# Patient Record
Sex: FEMALE | Race: WHITE | NOT HISPANIC OR LATINO | Employment: OTHER | URBAN - METROPOLITAN AREA
[De-identification: names, ages, dates, MRNs, and addresses within clinical notes are randomized per-mention and may not be internally consistent; named-entity substitution may affect disease eponyms.]

---

## 2017-08-15 ENCOUNTER — NEW PATIENT (OUTPATIENT)
Dept: URBAN - METROPOLITAN AREA CLINIC 11 | Facility: CLINIC | Age: 77
End: 2017-08-15

## 2017-08-15 VITALS — SYSTOLIC BLOOD PRESSURE: 128 MMHG | HEIGHT: 55 IN | DIASTOLIC BLOOD PRESSURE: 88 MMHG

## 2017-08-16 ASSESSMENT — TONOMETRY
OD_IOP_MMHG: 16
OS_IOP_MMHG: 16

## 2017-08-16 ASSESSMENT — VISUAL ACUITY
OD_SC: 20/40+1
OS_SC: 20/40-2

## 2017-09-19 ENCOUNTER — FOLLOW UP (OUTPATIENT)
Dept: URBAN - METROPOLITAN AREA CLINIC 11 | Facility: CLINIC | Age: 77
End: 2017-09-19

## 2017-09-19 ASSESSMENT — VISUAL ACUITY
OD_SC: 20/25-2
OS_SC: 20/50

## 2017-09-19 ASSESSMENT — TONOMETRY: OS_IOP_MMHG: 10

## 2017-11-21 ENCOUNTER — FOLLOW UP (OUTPATIENT)
Dept: URBAN - METROPOLITAN AREA CLINIC 11 | Facility: CLINIC | Age: 77
End: 2017-11-21

## 2017-11-21 ASSESSMENT — TONOMETRY
OD_IOP_MMHG: 14
OS_IOP_MMHG: 15

## 2017-11-21 ASSESSMENT — VISUAL ACUITY
OD_SC: 20/30+2
OS_SC: 20/50+1

## 2018-01-02 ENCOUNTER — FOLLOW UP (OUTPATIENT)
Dept: URBAN - METROPOLITAN AREA CLINIC 11 | Facility: CLINIC | Age: 78
End: 2018-01-02

## 2018-03-12 ENCOUNTER — FOLLOW UP (OUTPATIENT)
Dept: URBAN - METROPOLITAN AREA CLINIC 11 | Facility: CLINIC | Age: 78
End: 2018-03-12

## 2018-03-14 ASSESSMENT — TONOMETRY: OS_IOP_MMHG: 13

## 2018-03-14 ASSESSMENT — VISUAL ACUITY
OS_SC: 20/40+1
OD_SC: 20/30+1

## 2018-04-17 ENCOUNTER — FOLLOW UP (OUTPATIENT)
Dept: URBAN - METROPOLITAN AREA CLINIC 11 | Facility: CLINIC | Age: 78
End: 2018-04-17

## 2018-04-17 VITALS — DIASTOLIC BLOOD PRESSURE: 98 MMHG | SYSTOLIC BLOOD PRESSURE: 142 MMHG | HEIGHT: 55 IN

## 2018-04-17 ASSESSMENT — VISUAL ACUITY
OD_SC: 20/40-1
OS_SC: 20/100-1

## 2018-04-17 ASSESSMENT — TONOMETRY: OS_IOP_MMHG: 14

## 2018-08-02 ENCOUNTER — FOLLOW UP (OUTPATIENT)
Dept: URBAN - METROPOLITAN AREA CLINIC 11 | Facility: CLINIC | Age: 78
End: 2018-08-02

## 2018-08-02 ASSESSMENT — VISUAL ACUITY
OD_SC: 20/50-2
OS_SC: 20/30

## 2018-08-02 ASSESSMENT — TONOMETRY
OD_IOP_MMHG: 12
OS_IOP_MMHG: 13

## 2018-10-30 ENCOUNTER — FOLLOW UP (OUTPATIENT)
Dept: URBAN - METROPOLITAN AREA CLINIC 11 | Facility: CLINIC | Age: 78
End: 2018-10-30

## 2018-10-30 ASSESSMENT — TONOMETRY
OS_IOP_MMHG: 9
OD_IOP_MMHG: 7

## 2018-10-30 ASSESSMENT — VISUAL ACUITY
OS_SC: 20/40
OD_SC: 20/40-1

## 2018-11-29 ENCOUNTER — FOLLOW UP (OUTPATIENT)
Dept: URBAN - METROPOLITAN AREA CLINIC 11 | Facility: CLINIC | Age: 78
End: 2018-11-29

## 2018-11-29 ASSESSMENT — VISUAL ACUITY
OD_PH: 20/40-2
OS_SC: 20/40
OD_SC: 20/50+1

## 2018-11-29 ASSESSMENT — TONOMETRY
OS_IOP_MMHG: 11
OD_IOP_MMHG: 09

## 2019-01-17 ENCOUNTER — FOLLOW UP (OUTPATIENT)
Dept: URBAN - METROPOLITAN AREA CLINIC 11 | Facility: CLINIC | Age: 79
End: 2019-01-17

## 2019-01-17 ASSESSMENT — TONOMETRY
OD_IOP_MMHG: 10
OS_IOP_MMHG: 10

## 2019-01-17 ASSESSMENT — VISUAL ACUITY
OD_SC: 20/400
OS_SC: 20/30-1

## 2019-03-12 ENCOUNTER — FOLLOW UP (OUTPATIENT)
Dept: URBAN - METROPOLITAN AREA CLINIC 11 | Facility: CLINIC | Age: 79
End: 2019-03-12

## 2019-03-12 ASSESSMENT — VISUAL ACUITY
OD_SC: 20/40
OS_SC: 20/50-2

## 2019-03-12 ASSESSMENT — TONOMETRY
OS_IOP_MMHG: 16
OD_IOP_MMHG: 17

## 2019-04-04 ENCOUNTER — FOLLOW UP (OUTPATIENT)
Dept: URBAN - METROPOLITAN AREA CLINIC 11 | Facility: CLINIC | Age: 79
End: 2019-04-04

## 2019-04-04 ASSESSMENT — TONOMETRY
OS_IOP_MMHG: 12
OD_IOP_MMHG: 10

## 2019-04-04 ASSESSMENT — VISUAL ACUITY
OD_SC: 20/30-1
OS_SC: 20/40+2

## 2019-09-03 NOTE — PATIENT DISCUSSION
DRY EYE SYNDROME OU: SHE NOTES THAT ATs HELPS WHEN SHE DOES USE THEM ONCE DAILY. CONTINUE TO USE GOOD QUALITY ARTIFICIAL TEARS 3-4 TIMES DAILY. INSTRUCTED PATIENT NOT TO USE VISINE OR CLEAR EYES. WILL CONTINUE TO FOLLOW.

## 2019-11-12 ENCOUNTER — FOLLOW UP (OUTPATIENT)
Dept: URBAN - METROPOLITAN AREA CLINIC 11 | Facility: CLINIC | Age: 79
End: 2019-11-12

## 2019-11-12 ASSESSMENT — VISUAL ACUITY
OD_SC: 20/40-2
OS_SC: 20/30

## 2019-11-12 ASSESSMENT — TONOMETRY
OS_IOP_MMHG: 10
OD_IOP_MMHG: 10

## 2020-01-09 ENCOUNTER — FOLLOW UP (OUTPATIENT)
Dept: URBAN - METROPOLITAN AREA CLINIC 11 | Facility: CLINIC | Age: 80
End: 2020-01-09

## 2020-01-09 ASSESSMENT — TONOMETRY
OS_IOP_MMHG: 12
OD_IOP_MMHG: 12

## 2020-01-09 ASSESSMENT — VISUAL ACUITY
OS_SC: 20/30
OD_SC: 20/70-1

## 2020-04-27 ENCOUNTER — FOLLOW UP (OUTPATIENT)
Dept: URBAN - METROPOLITAN AREA CLINIC 11 | Facility: CLINIC | Age: 80
End: 2020-04-27

## 2020-04-28 ASSESSMENT — TONOMETRY
OD_IOP_MMHG: 8
OS_IOP_MMHG: 9

## 2020-04-28 ASSESSMENT — VISUAL ACUITY
OS_SC: 20/40-2
OD_SC: 20/50-1

## 2020-05-28 ENCOUNTER — FOLLOW UP (OUTPATIENT)
Dept: URBAN - METROPOLITAN AREA CLINIC 11 | Facility: CLINIC | Age: 80
End: 2020-05-28

## 2020-05-28 ASSESSMENT — VISUAL ACUITY
OD_SC: 20/30-2
OS_SC: 20/30-2

## 2020-05-28 ASSESSMENT — TONOMETRY
OS_IOP_MMHG: 10
OD_IOP_MMHG: 10

## 2020-06-23 NOTE — PATIENT DISCUSSION
ALLERGIC CONJUNCTIVITIS:   The patient is advised to use artificial tears for symptomatic relief. Call office for appointment if no improvement or worsens.  Instructed pt. to get OTC Zaditor bid ou and use artificial tears 3-4 times daily

## 2020-06-23 NOTE — PATIENT DISCUSSION
DRY EYES : Discussed with patient the importance of keeping the eye moist and the symptoms associated with dry eyes including blurry vision, tearing, burning, and luis alberto sensation. Advised patient to minimize use of any fans blowing directly on the face. Advised patient to continue with artificial tears 2-3 times daily.

## 2020-07-02 ENCOUNTER — FOLLOW UP (OUTPATIENT)
Dept: URBAN - METROPOLITAN AREA CLINIC 11 | Facility: CLINIC | Age: 80
End: 2020-07-02

## 2020-07-02 ASSESSMENT — VISUAL ACUITY
OS_SC: 20/30-1
OD_SC: 20/200-1

## 2020-07-02 ASSESSMENT — TONOMETRY
OD_IOP_MMHG: 11
OS_IOP_MMHG: 12

## 2020-07-28 ENCOUNTER — FOLLOW UP (OUTPATIENT)
Dept: URBAN - METROPOLITAN AREA CLINIC 11 | Facility: CLINIC | Age: 80
End: 2020-07-28

## 2020-07-28 ASSESSMENT — TONOMETRY
OD_IOP_MMHG: 12
OS_IOP_MMHG: 10

## 2020-07-28 ASSESSMENT — VISUAL ACUITY
OD_SC: 20/400
OS_SC: 20/50+2

## 2020-09-29 ENCOUNTER — FOLLOW UP (OUTPATIENT)
Dept: URBAN - METROPOLITAN AREA CLINIC 11 | Facility: CLINIC | Age: 80
End: 2020-09-29

## 2020-09-29 RX ORDER — PREDNISOLONE ACETATE 10 MG/ML
1 SUSPENSION/ DROPS OPHTHALMIC
Start: 2020-09-29

## 2020-09-29 ASSESSMENT — VISUAL ACUITY
OS_SC: 20/40+1
OD_SC: 20/400

## 2020-09-29 ASSESSMENT — TONOMETRY
OS_IOP_MMHG: 12
OD_IOP_MMHG: 10

## 2020-11-09 ENCOUNTER — FOLLOW UP (OUTPATIENT)
Dept: URBAN - METROPOLITAN AREA CLINIC 18 | Facility: CLINIC | Age: 80
End: 2020-11-09

## 2020-11-10 ASSESSMENT — TONOMETRY
OS_IOP_MMHG: 10
OD_IOP_MMHG: 09

## 2020-11-10 ASSESSMENT — VISUAL ACUITY
OD_SC: 20/400-
OS_SC: 20/30-1

## 2021-01-05 ENCOUNTER — FOLLOW UP (OUTPATIENT)
Dept: URBAN - METROPOLITAN AREA CLINIC 11 | Facility: CLINIC | Age: 81
End: 2021-01-05

## 2021-01-05 ASSESSMENT — VISUAL ACUITY
OD_SC: 20/400
OS_SC: 20/60-2

## 2021-01-05 ASSESSMENT — TONOMETRY: OS_IOP_MMHG: 9

## 2021-04-01 ENCOUNTER — FOLLOW UP (OUTPATIENT)
Dept: URBAN - METROPOLITAN AREA CLINIC 11 | Facility: CLINIC | Age: 81
End: 2021-04-01

## 2021-04-05 ASSESSMENT — TONOMETRY
OD_IOP_MMHG: 10
OS_IOP_MMHG: 13

## 2021-04-05 ASSESSMENT — VISUAL ACUITY
OD_SC: CF 3FT
OS_SC: 20/40

## 2021-04-08 NOTE — PATIENT DISCUSSION
DRY EYES : Discussed with patient the importance of keeping the eye moist and the symptoms associated with dry eyes including blurry vision, tearing, burning, and luis alberto sensation. Tear Lab was performed today to evaluate the severity of dryness. Advised patient to minimize use of any fans blowing directly on the face. Advised patient to continue with over the counter artificial tears 2-3 times daily.

## 2021-04-12 NOTE — PATIENT DISCUSSION
PAWAN FU: PLUGS ARE STILL IN. PATIENT IS USING ARTIFICIAL TEARS EVERY 2 HOURS AND STILL HAVING DISCOMFORT.  WILL TRY PATIENT ON RESTASIS BID OU AND CHECK BACK IN 6 WEEKS

## 2021-04-20 ENCOUNTER — TREATMENT ONLY (OUTPATIENT)
Dept: URBAN - METROPOLITAN AREA CLINIC 11 | Facility: CLINIC | Age: 81
End: 2021-04-20

## 2021-04-20 DIAGNOSIS — H35.3231: ICD-10-CM

## 2021-04-20 PROCEDURE — 67028 INJECTION EYE DRUG: CPT

## 2021-04-20 ASSESSMENT — TONOMETRY: OS_IOP_MMHG: 12

## 2021-04-20 ASSESSMENT — VISUAL ACUITY
OD_SC: CF 2FT
OS_SC: 20/40+2

## 2021-05-11 ENCOUNTER — TREATMENT ONLY (OUTPATIENT)
Dept: URBAN - METROPOLITAN AREA CLINIC 11 | Facility: CLINIC | Age: 81
End: 2021-05-11

## 2021-05-11 DIAGNOSIS — H35.3231: ICD-10-CM

## 2021-05-11 PROCEDURE — 67028 INJECTION EYE DRUG: CPT

## 2021-05-11 ASSESSMENT — VISUAL ACUITY
OS_SC: 20/40-2
OS_PH: 20/30-2
OD_SC: CF 2FT

## 2021-05-11 ASSESSMENT — TONOMETRY: OD_IOP_MMHG: 14

## 2021-06-08 ENCOUNTER — TREATMENT ONLY (OUTPATIENT)
Dept: URBAN - METROPOLITAN AREA CLINIC 11 | Facility: CLINIC | Age: 81
End: 2021-06-08

## 2021-06-08 DIAGNOSIS — H35.3231: ICD-10-CM

## 2021-06-08 PROCEDURE — 67028 INJECTION EYE DRUG: CPT

## 2021-06-08 ASSESSMENT — VISUAL ACUITY
OS_SC: 20/40+1
OD_SC: CF 2FT

## 2021-06-08 ASSESSMENT — TONOMETRY: OS_IOP_MMHG: 10

## 2021-06-11 NOTE — PATIENT DISCUSSION
PAWAN/K SICCA, OU - EYSUVIS (sample given today) QID OU X 2 WEEKS THEN BID OU X 2 WEEKS. CONTINUE RESTASIS BID OU AND PRES-FREE SYSTANE ATS PRN. DISCUSSED THAT RESTASIS CAN TAKE UP TO 90 DAYS TO RELIEVE SYMPTOMS, STRESSED COMPLIANCE. CONTINUE THE DAILY INTAKE OF OMEGA 3/FATTY ACIDS.

## 2021-06-11 NOTE — PATIENT DISCUSSION
Dry Eye Syndrome (PAWAN) Counseling: Dry Eye Syndrome, also known as Keratoconjunctivitis Sicca, is a common condition that occurs when your tears are not able to provide adequate lubrication for your eyes. Symptoms can be exacerbated by environmental factors such as smoke, wind, or prolonged eye use. Treatment options include, but are not limited to, artificial tears, punctal plugs, Restasis, oral omega-3 supplements, and lubricating ointments. I have explained to the patient that succesful management is dependent on patient compliance with treatment as prescribed and/or the treatment regimen.

## 2021-07-01 ENCOUNTER — TREATMENT ONLY (OUTPATIENT)
Dept: URBAN - METROPOLITAN AREA CLINIC 11 | Facility: CLINIC | Age: 81
End: 2021-07-01

## 2021-07-01 DIAGNOSIS — H35.3231: ICD-10-CM

## 2021-07-01 PROCEDURE — 67028 INJECTION EYE DRUG: CPT

## 2021-07-01 ASSESSMENT — VISUAL ACUITY
OS_SC: 20/40-2
OD_SC: CF 1FT

## 2021-07-01 ASSESSMENT — TONOMETRY: OD_IOP_MMHG: 16

## 2021-08-09 ENCOUNTER — FOLLOW UP (OUTPATIENT)
Age: 81
End: 2021-08-09

## 2021-08-09 DIAGNOSIS — H35.3231: ICD-10-CM

## 2021-08-09 DIAGNOSIS — H43.813: ICD-10-CM

## 2021-08-09 PROCEDURE — 99214 OFFICE O/P EST MOD 30 MIN: CPT

## 2021-08-09 PROCEDURE — 92134 CPTRZ OPH DX IMG PST SGM RTA: CPT

## 2021-08-09 ASSESSMENT — VISUAL ACUITY: OS_SC: 20/40+1

## 2021-08-09 ASSESSMENT — TONOMETRY
OD_IOP_MMHG: 10
OS_IOP_MMHG: 11

## 2021-09-21 ENCOUNTER — FOLLOW UP (OUTPATIENT)
Dept: URBAN - METROPOLITAN AREA CLINIC 11 | Facility: CLINIC | Age: 81
End: 2021-09-21

## 2021-09-21 DIAGNOSIS — H43.813: ICD-10-CM

## 2021-09-21 DIAGNOSIS — H35.3231: ICD-10-CM

## 2021-09-21 PROCEDURE — 99213 OFFICE O/P EST LOW 20 MIN: CPT

## 2021-09-21 PROCEDURE — 92134 CPTRZ OPH DX IMG PST SGM RTA: CPT

## 2021-09-21 ASSESSMENT — TONOMETRY
OS_IOP_MMHG: 10
OD_IOP_MMHG: 12

## 2021-09-21 ASSESSMENT — VISUAL ACUITY
OD_SC: CF 4FT
OS_SC: 20/30-2

## 2021-11-23 ENCOUNTER — FOLLOW UP (OUTPATIENT)
Dept: URBAN - METROPOLITAN AREA CLINIC 11 | Facility: CLINIC | Age: 81
End: 2021-11-23

## 2021-11-23 DIAGNOSIS — H43.813: ICD-10-CM

## 2021-11-23 DIAGNOSIS — H35.3231: ICD-10-CM

## 2021-11-23 PROCEDURE — 99214 OFFICE O/P EST MOD 30 MIN: CPT

## 2021-11-23 PROCEDURE — 67028 INJECTION EYE DRUG: CPT

## 2021-11-23 PROCEDURE — 92134 CPTRZ OPH DX IMG PST SGM RTA: CPT

## 2021-11-23 ASSESSMENT — VISUAL ACUITY
OS_SC: 20/40+1
OD_SC: CF 2FT

## 2021-11-23 ASSESSMENT — TONOMETRY
OS_IOP_MMHG: 14
OD_IOP_MMHG: 12

## 2022-01-20 ENCOUNTER — FOLLOW UP (OUTPATIENT)
Dept: URBAN - METROPOLITAN AREA CLINIC 11 | Facility: CLINIC | Age: 82
End: 2022-01-20

## 2022-01-20 DIAGNOSIS — H43.813: ICD-10-CM

## 2022-01-20 DIAGNOSIS — H35.3231: ICD-10-CM

## 2022-01-20 PROCEDURE — 92134 CPTRZ OPH DX IMG PST SGM RTA: CPT

## 2022-01-20 PROCEDURE — 99214 OFFICE O/P EST MOD 30 MIN: CPT

## 2022-01-20 ASSESSMENT — TONOMETRY
OS_IOP_MMHG: 11
OD_IOP_MMHG: 12

## 2022-01-20 ASSESSMENT — VISUAL ACUITY
OD_SC: CF 3FT
OS_SC: 20/40

## 2022-02-24 ENCOUNTER — FOLLOW UP (OUTPATIENT)
Dept: URBAN - METROPOLITAN AREA CLINIC 11 | Facility: CLINIC | Age: 82
End: 2022-02-24

## 2022-02-24 DIAGNOSIS — H35.3231: ICD-10-CM

## 2022-02-24 DIAGNOSIS — H43.813: ICD-10-CM

## 2022-02-24 PROCEDURE — 99214 OFFICE O/P EST MOD 30 MIN: CPT

## 2022-02-24 PROCEDURE — 92134 CPTRZ OPH DX IMG PST SGM RTA: CPT

## 2022-02-24 ASSESSMENT — TONOMETRY
OS_IOP_MMHG: 13
OD_IOP_MMHG: 13

## 2022-02-24 ASSESSMENT — VISUAL ACUITY: OS_SC: 20/40+1

## 2022-04-05 ENCOUNTER — COMPREHENSIVE EXAM (OUTPATIENT)
Dept: URBAN - METROPOLITAN AREA CLINIC 11 | Facility: CLINIC | Age: 82
End: 2022-04-05

## 2022-04-05 DIAGNOSIS — H43.813: ICD-10-CM

## 2022-04-05 DIAGNOSIS — H35.3231: ICD-10-CM

## 2022-04-05 PROCEDURE — 92134 CPTRZ OPH DX IMG PST SGM RTA: CPT

## 2022-04-05 PROCEDURE — 99214 OFFICE O/P EST MOD 30 MIN: CPT

## 2022-04-05 PROCEDURE — 67028 INJECTION EYE DRUG: CPT

## 2022-04-05 ASSESSMENT — TONOMETRY
OD_IOP_MMHG: 16
OS_IOP_MMHG: 14

## 2022-04-05 ASSESSMENT — VISUAL ACUITY
OS_SC: 20/30-2
OD_SC: CF 2FT

## 2022-05-17 ENCOUNTER — FOLLOW UP (OUTPATIENT)
Dept: URBAN - METROPOLITAN AREA CLINIC 11 | Facility: CLINIC | Age: 82
End: 2022-05-17

## 2022-05-17 DIAGNOSIS — H35.3231: ICD-10-CM

## 2022-05-17 DIAGNOSIS — H43.813: ICD-10-CM

## 2022-05-17 PROCEDURE — 99213 OFFICE O/P EST LOW 20 MIN: CPT

## 2022-05-17 PROCEDURE — 92134 CPTRZ OPH DX IMG PST SGM RTA: CPT

## 2022-05-17 ASSESSMENT — VISUAL ACUITY: OS_SC: 20/40

## 2022-05-17 ASSESSMENT — TONOMETRY
OS_IOP_MMHG: 9
OD_IOP_MMHG: 7

## 2022-07-14 ENCOUNTER — FOLLOW UP (OUTPATIENT)
Dept: URBAN - METROPOLITAN AREA CLINIC 11 | Facility: CLINIC | Age: 82
End: 2022-07-14

## 2022-07-14 DIAGNOSIS — H43.813: ICD-10-CM

## 2022-07-14 DIAGNOSIS — H35.3212: ICD-10-CM

## 2022-07-14 DIAGNOSIS — H35.3221: ICD-10-CM

## 2022-07-14 PROCEDURE — 99214 OFFICE O/P EST MOD 30 MIN: CPT

## 2022-07-14 PROCEDURE — 92134 CPTRZ OPH DX IMG PST SGM RTA: CPT

## 2022-07-14 ASSESSMENT — TONOMETRY
OD_IOP_MMHG: 9
OS_IOP_MMHG: 9

## 2022-07-14 ASSESSMENT — VISUAL ACUITY: OS_SC: 20/50

## 2022-08-29 ENCOUNTER — FOLLOW UP (OUTPATIENT)
Dept: URBAN - METROPOLITAN AREA CLINIC 11 | Facility: CLINIC | Age: 82
End: 2022-08-29

## 2022-08-29 DIAGNOSIS — H35.3212: ICD-10-CM

## 2022-08-29 DIAGNOSIS — H43.813: ICD-10-CM

## 2022-08-29 DIAGNOSIS — H35.3221: ICD-10-CM

## 2022-08-29 PROCEDURE — 99214 OFFICE O/P EST MOD 30 MIN: CPT

## 2022-08-29 PROCEDURE — 92134 CPTRZ OPH DX IMG PST SGM RTA: CPT

## 2022-08-29 ASSESSMENT — TONOMETRY
OS_IOP_MMHG: 13
OD_IOP_MMHG: 10

## 2022-10-24 ENCOUNTER — ESTABLISHED PATIENT (OUTPATIENT)
Dept: URBAN - METROPOLITAN AREA CLINIC 18 | Facility: CLINIC | Age: 82
End: 2022-10-24

## 2022-10-24 DIAGNOSIS — H43.813: ICD-10-CM

## 2022-10-24 DIAGNOSIS — Z96.1: ICD-10-CM

## 2022-10-24 DIAGNOSIS — H35.3212: ICD-10-CM

## 2022-10-24 DIAGNOSIS — H35.3221: ICD-10-CM

## 2022-10-24 PROCEDURE — 92134 CPTRZ OPH DX IMG PST SGM RTA: CPT

## 2022-10-24 PROCEDURE — 99214 OFFICE O/P EST MOD 30 MIN: CPT

## 2022-10-24 PROCEDURE — 67028 INJECTION EYE DRUG: CPT

## 2022-10-24 ASSESSMENT — TONOMETRY
OD_IOP_MMHG: 11
OS_IOP_MMHG: 14

## 2022-10-24 ASSESSMENT — VISUAL ACUITY: OS_SC: 20/40-2

## 2022-11-29 ENCOUNTER — ESTABLISHED PATIENT (OUTPATIENT)
Dept: URBAN - METROPOLITAN AREA CLINIC 11 | Facility: CLINIC | Age: 82
End: 2022-11-29

## 2022-11-29 DIAGNOSIS — H35.3221: ICD-10-CM

## 2022-11-29 DIAGNOSIS — H35.3212: ICD-10-CM

## 2022-11-29 DIAGNOSIS — H43.813: ICD-10-CM

## 2022-11-29 PROCEDURE — 92134 CPTRZ OPH DX IMG PST SGM RTA: CPT

## 2022-11-29 PROCEDURE — 99214 OFFICE O/P EST MOD 30 MIN: CPT

## 2022-11-29 ASSESSMENT — TONOMETRY
OS_IOP_MMHG: 14
OD_IOP_MMHG: 14

## 2022-11-29 ASSESSMENT — VISUAL ACUITY: OS_SC: 20/40-1

## 2023-01-17 ENCOUNTER — FOLLOW UP (OUTPATIENT)
Dept: URBAN - METROPOLITAN AREA CLINIC 11 | Facility: CLINIC | Age: 83
End: 2023-01-17

## 2023-01-17 DIAGNOSIS — H43.813: ICD-10-CM

## 2023-01-17 DIAGNOSIS — H35.3212: ICD-10-CM

## 2023-01-17 DIAGNOSIS — H35.3221: ICD-10-CM

## 2023-01-17 DIAGNOSIS — Z96.1: ICD-10-CM

## 2023-01-17 PROCEDURE — 67028 INJECTION EYE DRUG: CPT

## 2023-01-17 PROCEDURE — 92134 CPTRZ OPH DX IMG PST SGM RTA: CPT

## 2023-01-17 PROCEDURE — 99214 OFFICE O/P EST MOD 30 MIN: CPT

## 2023-01-17 ASSESSMENT — TONOMETRY
OD_IOP_MMHG: 9
OS_IOP_MMHG: 9

## 2023-01-17 ASSESSMENT — VISUAL ACUITY
OS_SC: 20/50-2
OS_PH: 20/40

## 2023-04-18 ENCOUNTER — COMPREHENSIVE EXAM (OUTPATIENT)
Dept: URBAN - METROPOLITAN AREA CLINIC 11 | Facility: CLINIC | Age: 83
End: 2023-04-18

## 2023-04-18 DIAGNOSIS — H43.813: ICD-10-CM

## 2023-04-18 DIAGNOSIS — H35.3221: ICD-10-CM

## 2023-04-18 DIAGNOSIS — H35.3212: ICD-10-CM

## 2023-04-18 DIAGNOSIS — Z96.1: ICD-10-CM

## 2023-04-18 PROCEDURE — 99214 OFFICE O/P EST MOD 30 MIN: CPT

## 2023-04-18 PROCEDURE — 92134 CPTRZ OPH DX IMG PST SGM RTA: CPT

## 2023-04-18 ASSESSMENT — TONOMETRY
OS_IOP_MMHG: 9
OD_IOP_MMHG: 8

## 2023-04-18 ASSESSMENT — VISUAL ACUITY
OS_SC: 20/40-1
OD_SC: CF 1FT

## 2023-06-15 ENCOUNTER — FOLLOW UP (OUTPATIENT)
Dept: URBAN - METROPOLITAN AREA CLINIC 11 | Facility: CLINIC | Age: 83
End: 2023-06-15

## 2023-06-15 DIAGNOSIS — H35.3221: ICD-10-CM

## 2023-06-15 DIAGNOSIS — Z96.1: ICD-10-CM

## 2023-06-15 DIAGNOSIS — H43.813: ICD-10-CM

## 2023-06-15 DIAGNOSIS — H35.3212: ICD-10-CM

## 2023-06-15 PROCEDURE — 92134 CPTRZ OPH DX IMG PST SGM RTA: CPT

## 2023-06-15 PROCEDURE — 99214 OFFICE O/P EST MOD 30 MIN: CPT

## 2023-06-15 PROCEDURE — 67028 INJECTION EYE DRUG: CPT

## 2023-06-15 ASSESSMENT — TONOMETRY
OD_IOP_MMHG: 7
OS_IOP_MMHG: 9

## 2023-06-15 ASSESSMENT — VISUAL ACUITY
OD_SC: CF 1FT
OS_SC: 20/200+1

## 2023-07-18 ENCOUNTER — CLINIC PROCEDURE ONLY (OUTPATIENT)
Dept: URBAN - METROPOLITAN AREA CLINIC 11 | Facility: CLINIC | Age: 83
End: 2023-07-18

## 2023-07-18 DIAGNOSIS — H35.3221: ICD-10-CM

## 2023-07-18 PROCEDURE — 67028 INJECTION EYE DRUG: CPT

## 2023-07-18 ASSESSMENT — VISUAL ACUITY
OS_SC: 20/40
OD_SC: CF 1FT

## 2023-07-18 ASSESSMENT — TONOMETRY
OD_IOP_MMHG: 7
OS_IOP_MMHG: 8

## 2023-08-15 ENCOUNTER — CLINIC PROCEDURE ONLY (OUTPATIENT)
Dept: URBAN - METROPOLITAN AREA CLINIC 11 | Facility: CLINIC | Age: 83
End: 2023-08-15

## 2023-08-15 DIAGNOSIS — H35.3221: ICD-10-CM

## 2023-08-15 PROCEDURE — 67028 INJECTION EYE DRUG: CPT

## 2023-08-15 ASSESSMENT — VISUAL ACUITY
OD_SC: CF 1FT
OS_SC: 20/40-2

## 2023-08-15 ASSESSMENT — TONOMETRY
OS_IOP_MMHG: 12
OD_IOP_MMHG: 11

## 2023-09-19 ENCOUNTER — ESTABLISHED PATIENT (OUTPATIENT)
Dept: URBAN - METROPOLITAN AREA CLINIC 11 | Facility: CLINIC | Age: 83
End: 2023-09-19

## 2023-09-19 DIAGNOSIS — H35.3212: ICD-10-CM

## 2023-09-19 DIAGNOSIS — H43.813: ICD-10-CM

## 2023-09-19 DIAGNOSIS — H35.3221: ICD-10-CM

## 2023-09-19 PROCEDURE — 92134 CPTRZ OPH DX IMG PST SGM RTA: CPT

## 2023-09-19 PROCEDURE — 99213 OFFICE O/P EST LOW 20 MIN: CPT

## 2023-09-19 RX ORDER — OLOPATADINE HYDROCHLORIDE 2 MG/ML: 1 SOLUTION OPHTHALMIC AS NEEDED

## 2023-09-19 ASSESSMENT — VISUAL ACUITY: OS_SC: 20/40-2

## 2023-09-19 ASSESSMENT — TONOMETRY
OD_IOP_MMHG: 7
OS_IOP_MMHG: 8

## 2023-10-24 ENCOUNTER — FOLLOW UP (OUTPATIENT)
Dept: URBAN - METROPOLITAN AREA CLINIC 11 | Facility: CLINIC | Age: 83
End: 2023-10-24

## 2023-10-24 DIAGNOSIS — H35.3221: ICD-10-CM

## 2023-10-24 DIAGNOSIS — H35.3212: ICD-10-CM

## 2023-10-24 PROCEDURE — 99213 OFFICE O/P EST LOW 20 MIN: CPT

## 2023-10-24 PROCEDURE — 92134 CPTRZ OPH DX IMG PST SGM RTA: CPT

## 2023-10-24 ASSESSMENT — TONOMETRY
OS_IOP_MMHG: 10
OD_IOP_MMHG: 8

## 2023-10-24 ASSESSMENT — VISUAL ACUITY: OS_SC: 20/40-2

## 2023-12-21 ENCOUNTER — EMERGENCY VISIT (OUTPATIENT)
Dept: URBAN - METROPOLITAN AREA CLINIC 11 | Facility: CLINIC | Age: 83
End: 2023-12-21

## 2023-12-21 DIAGNOSIS — H35.3221: ICD-10-CM

## 2023-12-21 DIAGNOSIS — H35.3212: ICD-10-CM

## 2023-12-21 PROCEDURE — 92134 CPTRZ OPH DX IMG PST SGM RTA: CPT

## 2023-12-21 PROCEDURE — 99213 OFFICE O/P EST LOW 20 MIN: CPT

## 2023-12-21 ASSESSMENT — TONOMETRY
OS_IOP_MMHG: 4
OD_IOP_MMHG: 5

## 2023-12-21 ASSESSMENT — VISUAL ACUITY
OS_SC: 20/50-1
OS_PH: 20/40-1

## 2024-01-29 ENCOUNTER — FOLLOW UP (OUTPATIENT)
Dept: URBAN - METROPOLITAN AREA CLINIC 11 | Facility: CLINIC | Age: 84
End: 2024-01-29

## 2024-01-29 DIAGNOSIS — H35.3221: ICD-10-CM

## 2024-01-29 DIAGNOSIS — H43.813: ICD-10-CM

## 2024-01-29 DIAGNOSIS — H35.3212: ICD-10-CM

## 2024-01-29 PROCEDURE — 67028 INJECTION EYE DRUG: CPT

## 2024-01-29 PROCEDURE — 99213 OFFICE O/P EST LOW 20 MIN: CPT

## 2024-01-29 ASSESSMENT — TONOMETRY
OS_IOP_MMHG: 10
OD_IOP_MMHG: 9

## 2024-01-29 ASSESSMENT — VISUAL ACUITY: OS_SC: 20/40+2

## 2024-03-05 ENCOUNTER — FOLLOW UP (OUTPATIENT)
Dept: URBAN - METROPOLITAN AREA CLINIC 11 | Facility: CLINIC | Age: 84
End: 2024-03-05

## 2024-03-05 DIAGNOSIS — H43.813: ICD-10-CM

## 2024-03-05 DIAGNOSIS — H35.3221: ICD-10-CM

## 2024-03-05 DIAGNOSIS — H35.3212: ICD-10-CM

## 2024-03-05 PROCEDURE — 92134 CPTRZ OPH DX IMG PST SGM RTA: CPT

## 2024-03-05 PROCEDURE — 99213 OFFICE O/P EST LOW 20 MIN: CPT

## 2024-03-05 ASSESSMENT — TONOMETRY
OS_IOP_MMHG: 9
OD_IOP_MMHG: 8

## 2024-03-05 ASSESSMENT — VISUAL ACUITY: OS_SC: 20/50+2

## 2024-05-14 ENCOUNTER — ESTABLISHED PATIENT (OUTPATIENT)
Dept: URBAN - METROPOLITAN AREA CLINIC 11 | Facility: CLINIC | Age: 84
End: 2024-05-14

## 2024-05-14 DIAGNOSIS — H35.3221: ICD-10-CM

## 2024-05-14 DIAGNOSIS — H35.3212: ICD-10-CM

## 2024-05-14 DIAGNOSIS — H43.813: ICD-10-CM

## 2024-05-14 PROCEDURE — 99213 OFFICE O/P EST LOW 20 MIN: CPT

## 2024-05-14 PROCEDURE — 92134 CPTRZ OPH DX IMG PST SGM RTA: CPT

## 2024-05-14 ASSESSMENT — VISUAL ACUITY
OS_SC: 20/50
OD_SC: CF 1FT
OS_PH: 20/40+3

## 2024-05-14 ASSESSMENT — TONOMETRY
OS_IOP_MMHG: 10
OD_IOP_MMHG: 10

## 2024-08-20 ENCOUNTER — FOLLOW UP (OUTPATIENT)
Dept: URBAN - METROPOLITAN AREA CLINIC 11 | Facility: CLINIC | Age: 84
End: 2024-08-20

## 2024-08-20 DIAGNOSIS — H43.813: ICD-10-CM

## 2024-08-20 DIAGNOSIS — H35.3212: ICD-10-CM

## 2024-08-20 DIAGNOSIS — H35.3221: ICD-10-CM

## 2024-08-20 PROCEDURE — 92134 CPTRZ OPH DX IMG PST SGM RTA: CPT

## 2024-08-20 PROCEDURE — 99214 OFFICE O/P EST MOD 30 MIN: CPT

## 2024-08-20 ASSESSMENT — VISUAL ACUITY
OS_SC: 20/40-1
OU_CC: J3

## 2024-08-20 ASSESSMENT — TONOMETRY
OD_IOP_MMHG: 11
OS_IOP_MMHG: 13

## 2024-11-12 NOTE — PATIENT DISCUSSION
COUNSELING:
General:
MACULAR CYST: STABLE, SOME SCARRING BUT WNL
PCO OU:  Not visually significant at this time. Re-eval 1 year. If vision changes call office to make an appointment.
Posterior Capsular Fibrosis Counseling: The diagnosis of posterior capsular fibrosis (PCF), also referred to as a secondary cataract or posterior capsular opacification (PCO), was discussed with the patient. The patient understands that their symptoms and limitations are likely related to this condition.
Detail Level: Generalized
Detail Level: Zone
Detail Level: Detailed

## 2024-11-21 ENCOUNTER — COMPREHENSIVE EXAM (OUTPATIENT)
Dept: URBAN - METROPOLITAN AREA CLINIC 11 | Facility: CLINIC | Age: 84
End: 2024-11-21

## 2024-11-21 DIAGNOSIS — Z96.1: ICD-10-CM

## 2024-11-21 DIAGNOSIS — H43.813: ICD-10-CM

## 2024-11-21 DIAGNOSIS — H35.3221: ICD-10-CM

## 2024-11-21 DIAGNOSIS — H35.3212: ICD-10-CM

## 2024-11-21 PROCEDURE — 99214 OFFICE O/P EST MOD 30 MIN: CPT

## 2024-11-21 PROCEDURE — 92134 CPTRZ OPH DX IMG PST SGM RTA: CPT

## 2025-01-21 ENCOUNTER — COMPREHENSIVE EXAM (OUTPATIENT)
Age: 85
End: 2025-01-21

## 2025-01-21 DIAGNOSIS — H35.3212: ICD-10-CM

## 2025-01-21 DIAGNOSIS — Z96.1: ICD-10-CM

## 2025-01-21 DIAGNOSIS — H43.813: ICD-10-CM

## 2025-01-21 DIAGNOSIS — H35.3221: ICD-10-CM

## 2025-01-21 DIAGNOSIS — H04.123: ICD-10-CM

## 2025-01-21 PROCEDURE — 99214 OFFICE O/P EST MOD 30 MIN: CPT

## 2025-01-21 PROCEDURE — 92134 CPTRZ OPH DX IMG PST SGM RTA: CPT

## 2025-04-29 ENCOUNTER — CLINIC PROCEDURE ONLY (OUTPATIENT)
Age: 85
End: 2025-04-29